# Patient Record
Sex: MALE | Employment: FULL TIME | URBAN - METROPOLITAN AREA
[De-identification: names, ages, dates, MRNs, and addresses within clinical notes are randomized per-mention and may not be internally consistent; named-entity substitution may affect disease eponyms.]

---

## 2022-07-31 ENCOUNTER — OFFICE VISIT (OUTPATIENT)
Dept: URGENT CARE | Facility: CLINIC | Age: 32
End: 2022-07-31
Payer: COMMERCIAL

## 2022-07-31 VITALS
HEART RATE: 69 BPM | OXYGEN SATURATION: 97 % | SYSTOLIC BLOOD PRESSURE: 118 MMHG | DIASTOLIC BLOOD PRESSURE: 76 MMHG | WEIGHT: 189 LBS | HEIGHT: 65 IN | RESPIRATION RATE: 16 BRPM | TEMPERATURE: 97.6 F | BODY MASS INDEX: 31.49 KG/M2

## 2022-07-31 DIAGNOSIS — S61.012A LACERATION OF LEFT THUMB WITHOUT FOREIGN BODY WITHOUT DAMAGE TO NAIL, INITIAL ENCOUNTER: ICD-10-CM

## 2022-07-31 PROCEDURE — 90471 IMMUNIZATION ADMIN: CPT | Performed by: PHYSICIAN ASSISTANT

## 2022-07-31 PROCEDURE — 90715 TDAP VACCINE 7 YRS/> IM: CPT | Performed by: PHYSICIAN ASSISTANT

## 2022-07-31 PROCEDURE — 12001 RPR S/N/AX/GEN/TRNK 2.5CM/<: CPT | Performed by: PHYSICIAN ASSISTANT

## 2022-07-31 ASSESSMENT — ENCOUNTER SYMPTOMS: ROS SKIN COMMENTS: LEFT THUMB LACERATION

## 2022-07-31 NOTE — PROCEDURES
Laceration Repair    Date/Time: 7/31/2022 2:29 PM  Performed by: Vignesh De La Paz P.A.-C.  Authorized by: Vignesh De La Paz P.A.-C.   Body area: upper extremity  Location details: left thumb  Laceration length: 2 cm  Foreign bodies: no foreign bodies  Tendon involvement: none  Anesthesia: local infiltration and digital block    Anesthesia:  Local Anesthetic: lidocaine 1% without epinephrine  Anesthetic total: 3 mL  Preparation: Patient was prepped and draped in the usual sterile fashion.  Irrigation solution: saline  Irrigation method: syringe  Amount of cleaning: standard  Debridement: none  Degree of undermining: none  Skin closure: 4-0 nylon  Number of sutures: 5  Technique: simple  Approximation: close  Approximation difficulty: simple  Patient tolerance: patient tolerated the procedure well with no immediate complications      Verbal consent obtained prior to laceration repair today.  Area was initially irrigated and cleaned with Hibiclens and saline.  Betadine used to clean the area prior to local anesthetic.  Patient is still neurovascular intact distally and has full flexion extension following suture removal.  Recommend keeping the sutures in place for 7 days.  Wound care instructions discussed.  Antibiotic ointment and bandage placed today.

## 2025-04-29 ENCOUNTER — OFFICE VISIT (OUTPATIENT)
Dept: MEDICAL GROUP | Facility: CLINIC | Age: 35
End: 2025-04-29
Payer: COMMERCIAL

## 2025-04-29 VITALS
WEIGHT: 211.6 LBS | SYSTOLIC BLOOD PRESSURE: 129 MMHG | OXYGEN SATURATION: 93 % | TEMPERATURE: 97 F | BODY MASS INDEX: 35.25 KG/M2 | DIASTOLIC BLOOD PRESSURE: 81 MMHG | HEART RATE: 62 BPM | HEIGHT: 65 IN

## 2025-04-29 DIAGNOSIS — E66.812 CLASS 2 OBESITY WITH BODY MASS INDEX (BMI) OF 35.0 TO 35.9 IN ADULT, UNSPECIFIED OBESITY TYPE, UNSPECIFIED WHETHER SERIOUS COMORBIDITY PRESENT: ICD-10-CM

## 2025-04-29 DIAGNOSIS — Z13.79 GENETIC SCREENING: ICD-10-CM

## 2025-04-29 DIAGNOSIS — Z23 NEED FOR VACCINATION: ICD-10-CM

## 2025-04-29 DIAGNOSIS — Z11.59 NEED FOR HEPATITIS C SCREENING TEST: ICD-10-CM

## 2025-04-29 DIAGNOSIS — Z11.4 SCREENING FOR HIV (HUMAN IMMUNODEFICIENCY VIRUS): ICD-10-CM

## 2025-04-29 DIAGNOSIS — Z86.2 HISTORY OF FACTOR VIII DEFICIENCY: ICD-10-CM

## 2025-04-29 RX ORDER — PHENTERMINE HYDROCHLORIDE 30 MG/1
30 CAPSULE ORAL EVERY MORNING
Qty: 30 CAPSULE | Refills: 0 | Status: SHIPPED | OUTPATIENT
Start: 2025-04-29 | End: 2025-05-29

## 2025-04-29 RX ORDER — PHENTERMINE HYDROCHLORIDE 30 MG/1
30 CAPSULE ORAL EVERY MORNING
Qty: 30 CAPSULE | Refills: 0 | Status: SHIPPED | OUTPATIENT
Start: 2025-05-29 | End: 2025-06-28

## 2025-04-29 RX ORDER — PHENTERMINE HYDROCHLORIDE 30 MG/1
30 CAPSULE ORAL EVERY MORNING
Qty: 30 CAPSULE | Refills: 0 | Status: SHIPPED | OUTPATIENT
Start: 2025-06-28 | End: 2025-07-28

## 2025-04-29 ASSESSMENT — PATIENT HEALTH QUESTIONNAIRE - PHQ9: CLINICAL INTERPRETATION OF PHQ2 SCORE: 0

## 2025-04-29 NOTE — PROGRESS NOTES
"Subjective:     CC:   Chief Complaint   Patient presents with    Establish Care    Weight Loss       HISTORY OF THE PRESENT ILLNESS: Patient is a 34 y.o. male. This pleasant patient is here today to establish care and discuss weight loss.  Patient reports that he is interested in weight loss medication.  Had previously been on phentermine and had tolerated this well.     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 No other medical issues.   Daily Medications: denies  Past surgeries: wisdom teeth removal  Allergies: NKDA    Family history:  Mother with psoriatic arthritis and liver disease. History of bleeding issues.   Father with heart disease, HTN, obesity.     Social History:  Tobacco: Denies  Etoh: 2-3x/week. Beer.   Drug use: Denies    Preventative Care:  Last colonoscopy: n/a  Vaccinations: Hep B    No problems updated.    No current Epic-ordered outpatient medications on file.     No current [facility]-ordered facility-administered medications on file.       Health Maintenance: Completed    ROS:   See HPI      Objective:   Exam: /81 (BP Location: Right arm, Patient Position: Sitting, BP Cuff Size: Adult)   Pulse 62   Temp 36.1 °C (97 °F) (Temporal)   Ht 1.651 m (5' 5\")   Wt 96 kg (211 lb 9.6 oz)   SpO2 93%  Body mass index is 35.21 kg/m².    Physical Exam  Vitals reviewed.   Constitutional:       General: He is not in acute distress.     Appearance: Normal appearance.   HENT:      Head: Normocephalic and atraumatic.      Right Ear: Tympanic membrane, ear canal and external ear normal.      Left Ear: Tympanic membrane, ear canal and external ear normal.      Nose: Nose normal.      Mouth/Throat:      Mouth: Mucous membranes are moist.      Pharynx: No oropharyngeal exudate or posterior oropharyngeal erythema.      Comments: Enlarged tonsils bilaterally  Eyes:      Extraocular Movements: Extraocular movements intact.      Conjunctiva/sclera: Conjunctivae normal. "   Cardiovascular:      Rate and Rhythm: Normal rate and regular rhythm.      Heart sounds: No murmur heard.  Pulmonary:      Effort: Pulmonary effort is normal. No respiratory distress.      Breath sounds: Normal breath sounds.   Abdominal:      General: Abdomen is flat. There is no distension.      Palpations: Abdomen is soft.      Tenderness: There is no abdominal tenderness.   Musculoskeletal:         General: Normal range of motion.      Cervical back: Normal range of motion.   Skin:     General: Skin is warm.   Neurological:      Mental Status: He is alert.   Psychiatric:         Mood and Affect: Mood normal.         Behavior: Behavior normal.         Thought Content: Thought content normal.         Judgment: Judgment normal.         Labs: No recent labs to review.     Assessment & Plan:   34 y.o. male with the following -    Assessment & Plan  Class 2 obesity with body mass index (BMI) of 35.0 to 35.9 in adult, unspecified obesity type, unspecified whether serious comorbidity present  BMI 35.21.  Discussed exercise and diet regimen with patient.  He is going to try to change lifestyle, including waking up earlier in the morning to exercise.  He has previously been on phentermine and tolerated this medication well.  He is interested on restarting phentermine.  Discussed risk, benefits, side effects of medication and he verbalized understanding.  Controlled substance agreement filled out with patient in the office today.  PDMP reviewed.  Will initiate phentermine 30 mg every morning.  Will treat with phentermine for 30 days.  Also discussed recommendation below labs and patient is agreeable. 3-month follow-up or sooner if needed.  Orders:    Controlled Substance Treatment Agreement    Lipid Profile; Future    Comp Metabolic Panel; Future    CBC WITHOUT DIFFERENTIAL; Future    TSH WITH REFLEX TO FT4; Future    HEMOGLOBIN A1C; Future    phentermine 30 MG capsule; Take 1 Capsule by mouth every morning for 30  days.    phentermine 30 MG capsule; Take 1 Capsule by mouth every morning for 30 days.    phentermine 30 MG capsule; Take 1 Capsule by mouth every morning for 30 days.    History of factor VIII deficiency  Patient with a history of factor VII and factor VIII deficiency.  This was previously found on screening labs prior to an operation.  There is a family history of bleeding disorders on the maternal side.  He has not had recent lab work done.  Discussed recommendation for below labs and patient is agreeable.  Orders:    Prothrombin Time; Future    APTT; Future    FACTOR VII; Future    FACTOR VIII; Future    Need for hepatitis C screening test  Discussed screening recommendations for hep C and patient is agreeable.  Orders:    HEP C VIRUS ANTIBODY; Future    Screening for HIV (human immunodeficiency virus)  Discussed screening recommendations for HIV and patient is agreeable.  Orders:    HIV AG/AB COMBO ASSAY SCREENING; Future    Genetic screening  Discussed [employer] with patient and he is agreeable.  Referral sent today.  Orders:    Referral to Genetic Research Studies    Need for vaccination  Patient due for hepatitis B vaccine.  He is agreeable.  Given today.  Orders:    Hepatitis B Vaccine Adult 20+          No follow-ups on file.

## 2025-05-22 DIAGNOSIS — E66.812 CLASS 2 OBESITY WITH BODY MASS INDEX (BMI) OF 35.0 TO 35.9 IN ADULT, UNSPECIFIED OBESITY TYPE, UNSPECIFIED WHETHER SERIOUS COMORBIDITY PRESENT: ICD-10-CM

## 2025-05-22 NOTE — TELEPHONE ENCOUNTER
Received request via: Pharmacy    Was the patient seen in the last year in this department? Yes    Does the patient have an active prescription (recently filled or refills available) for medication(s) requested? No    Pharmacy Name: [facility] #[id number] - [place], [place] - [address]     Does the patient have long-term Plus and need 100-day supply? (This applies to ALL medications) Patient does not have SCP    This strength is on back order

## 2025-05-23 RX ORDER — PHENTERMINE HYDROCHLORIDE 30 MG/1
30 CAPSULE ORAL EVERY MORNING
Qty: 30 CAPSULE | Refills: 0 | OUTPATIENT
Start: 2025-05-23 | End: 2025-06-22